# Patient Record
(demographics unavailable — no encounter records)

---

## 2025-07-09 NOTE — PHYSICAL EXAM
[Jaundice] : jaundice [Acute Distress] : no acute distress [Discharge] : no discharge [Palpable Masses] : no palpable masses [Murmurs] : no murmurs [Tender] : nontender [Distended] : not distended [Hepatomegaly] : no hepatomegaly [Splenomegaly] : no splenomegaly [Circumcised] : not circumcised [Shay-Ortolani] : negative Shay-Ortolani [Spinal Dimple] : no spinal dimple [Tuft of Hair] : no tuft of hair [FreeTextEntry5] : SMALL SUBCONJUNCTIVAL HEMORRHAGE RIGHT EYE [FreeTextEntry6] : WANDERING RAPHE

## 2025-07-09 NOTE — HISTORY OF PRESENT ILLNESS
[Born at ___ Wks Gestation] : The patient was born at [unfilled] weeks gestation [C/S] : via  section [Other: _____] : at [unfilled] [BW: _____] : weight of [unfilled] [Length: _____] : length of [unfilled] [DW: _____] : Discharge weight was [unfilled] [Breast milk] : breast milk [Hepatitis B Vaccine Given] : Hepatitis B vaccine given [C/S Indication: ____] : ( [unfilled] ) [(1) _____] : [unfilled] [(5) _____] : [unfilled] [MBT: ____] : MBT - [unfilled] [Age: ___] : [unfilled] year old mother [P: ___] : P [unfilled] [None] : There are no risk factors [Hours between feeds ___] : Child is fed every [unfilled] hours [Vitamins ___] : Patient takes [unfilled] vitamins daily [Normal] : Normal [___ voids per day] : [unfilled] voids per day [Frequency of stools: ___] : Frequency of stools: [unfilled]  stools [per day] : per day. [Yellow] : yellow [Seedy] : seedy [In Bassinet/Crib] : sleeps in bassinet/crib [On back] : sleeps on back [No] : No cigarette smoke exposure [Rear facing car seat in back seat] : Rear facing car seat in back seat [Carbon Monoxide Detectors] : Carbon monoxide detectors at home [Smoke Detectors] : Smoke detectors at home. [HepBsAG] : HepBsAg negative [HIV] : HIV negative [GBS] : GBS negative [VDRL/RPR (Reactive)] : VDRL/RPR nonreactive [] : Circumcision: No [de-identified] : 42 [FreeTextEntry8] : SISTER HAD HYPERBILI DID NOT REQUIRE PHOTOTHERAPY [Co-sleeping] : no co-sleeping [Loose bedding, pillow, toys, and/or bumpers in crib] : no loose bedding, pillow, toys, and/or bumpers in crib [Pacifier] : Not using pacifier [Exposure to electronic nicotine delivery system] : No exposure to electronic nicotine delivery system [Nirsevimab Given] : Nirsevimab not given [FreeTextEntry7] : MOTHER (MICHAEL, STAY AT HOME MOM, 36Y) FATHER (PAOLA MACHANICS, 29Y) SISTER (ISAAC, 13M)  [FreeTextEntry9] : HOME

## 2025-07-09 NOTE — DISCUSSION/SUMMARY
[Normal Growth] : growth [Normal Development] : developmental [No Elimination Concerns] : elimination [Continue Regimen] : feeding [No Skin Concerns] : skin [Normal Sleep Pattern] : sleep [None] : no known medical problems [Anticipatory Guidance Given] : Anticipatory guidance addressed as per the history of present illness section [ Transition] :  transition [ Care] :  care [Nutritional Adequacy] : nutritional adequacy [Parental Well-Being] : parental well-being [Safety] : safety [Hepatitis B In Hospital] : Hepatitis B administered while in the hospital [No Vaccines] : no vaccines needed [No Medications] : ~He/She~ is not on any medications [Parent/Guardian] : Parent/Guardian [FreeTextEntry1] : Recommend exclusive breastfeeding, 8-12 feedings per day. Mother should continue prenatal vitamins and avoid alcohol. If formula is needed, recommend iron-fortified formulations every 2-3 hrs. When in car, patient should be in rear-facing car seat in back seat. Air dry umbillical stump. Put baby to sleep on back, in own crib with no loose or soft bedding. Limit baby's exposure to others, especially those with fever or unknown vaccine status. CALL ASAP FOR RED, WHITE/GREY OR BLACK STOOLS TO ER FOR RECTAL TEMP 100.4 OR MORE AT AGE LESS THAN 60 DOL

## 2025-07-09 NOTE — HISTORY OF PRESENT ILLNESS
[Born at ___ Wks Gestation] : The patient was born at [unfilled] weeks gestation [C/S] : via  section [Other: _____] : at [unfilled] [BW: _____] : weight of [unfilled] [Length: _____] : length of [unfilled] [DW: _____] : Discharge weight was [unfilled] [Breast milk] : breast milk [Hepatitis B Vaccine Given] : Hepatitis B vaccine given [C/S Indication: ____] : ( [unfilled] ) [(1) _____] : [unfilled] [(5) _____] : [unfilled] [MBT: ____] : MBT - [unfilled] [Age: ___] : [unfilled] year old mother [P: ___] : P [unfilled] [None] : There are no risk factors [Hours between feeds ___] : Child is fed every [unfilled] hours [Vitamins ___] : Patient takes [unfilled] vitamins daily [Normal] : Normal [___ voids per day] : [unfilled] voids per day [Frequency of stools: ___] : Frequency of stools: [unfilled]  stools [per day] : per day. [Yellow] : yellow [Seedy] : seedy [In Bassinet/Crib] : sleeps in bassinet/crib [On back] : sleeps on back [No] : No cigarette smoke exposure [Rear facing car seat in back seat] : Rear facing car seat in back seat [Carbon Monoxide Detectors] : Carbon monoxide detectors at home [Smoke Detectors] : Smoke detectors at home. [HepBsAG] : HepBsAg negative [HIV] : HIV negative [GBS] : GBS negative [VDRL/RPR (Reactive)] : VDRL/RPR nonreactive [] : Circumcision: No [de-identified] : 42 [FreeTextEntry8] : SISTER HAD HYPERBILI DID NOT REQUIRE PHOTOTHERAPY [Co-sleeping] : no co-sleeping [Loose bedding, pillow, toys, and/or bumpers in crib] : no loose bedding, pillow, toys, and/or bumpers in crib [Pacifier] : Not using pacifier [Exposure to electronic nicotine delivery system] : No exposure to electronic nicotine delivery system [Nirsevimab Given] : Nirsevimab not given [FreeTextEntry7] : MOTHER (MICHAEL, STAY AT HOME MOM, 36Y) FATHER (PAOLA MACHANICS, 29Y) SISTER (ISAAC, 13M)  [FreeTextEntry9] : HOME

## 2025-07-09 NOTE — HISTORY OF PRESENT ILLNESS
[Born at ___ Wks Gestation] : The patient was born at [unfilled] weeks gestation [C/S] : via  section [Other: _____] : at [unfilled] [BW: _____] : weight of [unfilled] [Length: _____] : length of [unfilled] [DW: _____] : Discharge weight was [unfilled] [Breast milk] : breast milk [Hepatitis B Vaccine Given] : Hepatitis B vaccine given [C/S Indication: ____] : ( [unfilled] ) [(1) _____] : [unfilled] [(5) _____] : [unfilled] [MBT: ____] : MBT - [unfilled] [Age: ___] : [unfilled] year old mother [P: ___] : P [unfilled] [None] : There are no risk factors [Hours between feeds ___] : Child is fed every [unfilled] hours [Vitamins ___] : Patient takes [unfilled] vitamins daily [Normal] : Normal [___ voids per day] : [unfilled] voids per day [Frequency of stools: ___] : Frequency of stools: [unfilled]  stools [per day] : per day. [Yellow] : yellow [Seedy] : seedy [In Bassinet/Crib] : sleeps in bassinet/crib [On back] : sleeps on back [No] : No cigarette smoke exposure [Rear facing car seat in back seat] : Rear facing car seat in back seat [Carbon Monoxide Detectors] : Carbon monoxide detectors at home [Smoke Detectors] : Smoke detectors at home. [HepBsAG] : HepBsAg negative [HIV] : HIV negative [GBS] : GBS negative [VDRL/RPR (Reactive)] : VDRL/RPR nonreactive [] : Circumcision: No [de-identified] : 42 [FreeTextEntry8] : SISTER HAD HYPERBILI DID NOT REQUIRE PHOTOTHERAPY [Co-sleeping] : no co-sleeping [Loose bedding, pillow, toys, and/or bumpers in crib] : no loose bedding, pillow, toys, and/or bumpers in crib [Pacifier] : Not using pacifier [Exposure to electronic nicotine delivery system] : No exposure to electronic nicotine delivery system [Nirsevimab Given] : Nirsevimab not given [FreeTextEntry7] : MOTHER (MICHAEL, STAY AT HOME MOM, 36Y) FATHER (PAOLA MACHANICS, 29Y) SISTER (ISAAC, 13M)  [FreeTextEntry9] : HOME

## 2025-07-19 NOTE — PHYSICAL EXAM
[Jaylan: ____] : Jaylan [unfilled] [Circumcised] : uncircumcised [Undescended Testicle] : descended testicle [NL] : normotonic [FreeTextEntry5] : MILD ICTERUS [FreeTextEntry6] : WANDERING RAPHAE